# Patient Record
Sex: MALE | Race: BLACK OR AFRICAN AMERICAN | NOT HISPANIC OR LATINO | Employment: FULL TIME | URBAN - METROPOLITAN AREA
[De-identification: names, ages, dates, MRNs, and addresses within clinical notes are randomized per-mention and may not be internally consistent; named-entity substitution may affect disease eponyms.]

---

## 2025-01-07 ENCOUNTER — HOSPITAL ENCOUNTER (EMERGENCY)
Facility: HOSPITAL | Age: 45
Discharge: HOME | End: 2025-01-07
Payer: COMMERCIAL

## 2025-01-07 ENCOUNTER — APPOINTMENT (OUTPATIENT)
Dept: RADIOLOGY | Facility: HOSPITAL | Age: 45
End: 2025-01-07
Payer: COMMERCIAL

## 2025-01-07 VITALS
HEIGHT: 72 IN | HEART RATE: 94 BPM | DIASTOLIC BLOOD PRESSURE: 81 MMHG | WEIGHT: 185 LBS | RESPIRATION RATE: 16 BRPM | OXYGEN SATURATION: 100 % | BODY MASS INDEX: 25.06 KG/M2 | TEMPERATURE: 97.2 F | SYSTOLIC BLOOD PRESSURE: 134 MMHG

## 2025-01-07 DIAGNOSIS — J01.00 ACUTE NON-RECURRENT MAXILLARY SINUSITIS: Primary | ICD-10-CM

## 2025-01-07 LAB
FLUAV RNA RESP QL NAA+PROBE: NOT DETECTED
FLUBV RNA RESP QL NAA+PROBE: NOT DETECTED
SARS-COV-2 RNA RESP QL NAA+PROBE: NOT DETECTED

## 2025-01-07 PROCEDURE — 99284 EMERGENCY DEPT VISIT MOD MDM: CPT | Mod: 25

## 2025-01-07 PROCEDURE — 87636 SARSCOV2 & INF A&B AMP PRB: CPT

## 2025-01-07 PROCEDURE — 2500000001 HC RX 250 WO HCPCS SELF ADMINISTERED DRUGS (ALT 637 FOR MEDICARE OP)

## 2025-01-07 PROCEDURE — 71045 X-RAY EXAM CHEST 1 VIEW: CPT

## 2025-01-07 PROCEDURE — 2500000004 HC RX 250 GENERAL PHARMACY W/ HCPCS (ALT 636 FOR OP/ED)

## 2025-01-07 PROCEDURE — 71045 X-RAY EXAM CHEST 1 VIEW: CPT | Mod: FOREIGN READ | Performed by: RADIOLOGY

## 2025-01-07 RX ORDER — GUAIFENESIN 600 MG/1
1200 TABLET, EXTENDED RELEASE ORAL 2 TIMES DAILY
Qty: 120 TABLET | Refills: 0 | Status: SHIPPED | OUTPATIENT
Start: 2025-01-07 | End: 2025-02-06

## 2025-01-07 RX ORDER — GUAIFENESIN 600 MG/1
600 TABLET, EXTENDED RELEASE ORAL 2 TIMES DAILY PRN
Status: DISCONTINUED | OUTPATIENT
Start: 2025-01-07 | End: 2025-01-07

## 2025-01-07 RX ORDER — GUAIFENESIN 600 MG/1
600 TABLET, EXTENDED RELEASE ORAL ONCE
Status: COMPLETED | OUTPATIENT
Start: 2025-01-07 | End: 2025-01-07

## 2025-01-07 RX ORDER — AMOXICILLIN AND CLAVULANATE POTASSIUM 875; 125 MG/1; MG/1
1 TABLET, FILM COATED ORAL EVERY 12 HOURS
Qty: 14 TABLET | Refills: 0 | Status: SHIPPED | OUTPATIENT
Start: 2025-01-07 | End: 2025-01-14

## 2025-01-07 RX ORDER — IBUPROFEN 800 MG/1
800 TABLET ORAL ONCE
Status: COMPLETED | OUTPATIENT
Start: 2025-01-07 | End: 2025-01-07

## 2025-01-07 RX ORDER — IBUPROFEN 800 MG/1
800 TABLET ORAL 3 TIMES DAILY
Qty: 21 TABLET | Refills: 0 | Status: SHIPPED | OUTPATIENT
Start: 2025-01-07 | End: 2025-01-14

## 2025-01-07 RX ORDER — AMOXICILLIN AND CLAVULANATE POTASSIUM 875; 125 MG/1; MG/1
1 TABLET, FILM COATED ORAL ONCE
Status: COMPLETED | OUTPATIENT
Start: 2025-01-07 | End: 2025-01-07

## 2025-01-07 RX ADMIN — AMOXICILLIN AND CLAVULANATE POTASSIUM 1 TABLET: 875; 125 TABLET, FILM COATED ORAL at 10:50

## 2025-01-07 RX ADMIN — IBUPROFEN 800 MG: 800 TABLET, FILM COATED ORAL at 10:50

## 2025-01-07 RX ADMIN — GUAIFENESIN 600 MG: 600 TABLET ORAL at 10:50

## 2025-01-07 ASSESSMENT — PAIN DESCRIPTION - PAIN TYPE: TYPE: ACUTE PAIN

## 2025-01-07 ASSESSMENT — PAIN - FUNCTIONAL ASSESSMENT: PAIN_FUNCTIONAL_ASSESSMENT: 0-10

## 2025-01-07 ASSESSMENT — PAIN SCALES - GENERAL: PAINLEVEL_OUTOF10: 5 - MODERATE PAIN

## 2025-01-07 ASSESSMENT — PAIN DESCRIPTION - LOCATION: LOCATION: CHEST

## 2025-01-07 NOTE — ED PROVIDER NOTES
HPI   Chief Complaint   Patient presents with    URI     Pt presents to ed via self for complaints of URI sx x 1 week. Cough, congestion sore throat runny nose and pain when coughing.       HPI  Patient is a 44-year-old male presents ED for flulike symptoms x 1 week.  Patient reports cough, congestion, nasal drainage, postnasal drip, productive cough.  Denies any fever or chills.  Denies any shortness of breath or chest pain.  Denies any abdominal pain or NVD, urinary symptoms.  No recent hospitalizations or surgeries.  No recent travel or sick contacts.  Patient does smoke cigarettes.      Patient History   Past Medical History:   Diagnosis Date    Personal history of other diseases of the respiratory system 01/16/2020    History of asthma     No past surgical history on file.  No family history on file.  Social History     Tobacco Use    Smoking status: Not on file    Smokeless tobacco: Not on file   Substance Use Topics    Alcohol use: Not on file    Drug use: Not on file       Physical Exam   ED Triage Vitals [01/07/25 1032]   Temperature Heart Rate Respirations BP   36.2 °C (97.2 °F) 94 16 134/81      Pulse Ox Temp src Heart Rate Source Patient Position   100 % -- -- Sitting      BP Location FiO2 (%)     Left arm --       Physical Exam  Vitals reviewed.   Constitutional:       General: He is not in acute distress.     Appearance: Normal appearance. He is not ill-appearing.   HENT:      Head: Normocephalic and atraumatic.      Nose: Congestion and rhinorrhea present.      Mouth/Throat:      Pharynx: No oropharyngeal exudate or posterior oropharyngeal erythema.   Eyes:      Pupils: Pupils are equal, round, and reactive to light.   Cardiovascular:      Rate and Rhythm: Normal rate and regular rhythm.      Heart sounds: Normal heart sounds.   Pulmonary:      Effort: Pulmonary effort is normal.      Breath sounds: Normal breath sounds.   Abdominal:      General: Abdomen is flat.   Musculoskeletal:         General:  Normal range of motion.      Cervical back: Normal range of motion and neck supple.   Skin:     General: Skin is warm and dry.   Neurological:      Mental Status: He is alert and oriented to person, place, and time.   Psychiatric:         Mood and Affect: Mood normal.         Behavior: Behavior normal.           ED Course & MDM   Diagnoses as of 01/07/25 1127   Acute non-recurrent maxillary sinusitis                 No data recorded     [place] Coma Scale Score: 15 (01/07/25 1031 : Brian Paladino, RN)                           Medical Decision Making  Parts of this chart have been completed using voice recognition software. Please excuse any errors of transcription.  My thought process and reason for plan has been formulated from the time that I saw the patient until the time of disposition and is not specific to one specific moment during their visit and furthermore my MDM encompasses this entire chart and not only this text box.    HPI:   A medically appropriate HPI was obtained, outlined above.    Rory Langston is a  44 y.o. male    Chief Complaint   Patient presents with    URI     Pt presents to ed via self for complaints of URI sx x 1 week. Cough, congestion sore throat runny nose and pain when coughing.       Past Medical History:   Diagnosis Date    Personal history of other diseases of the respiratory system 01/16/2020    History of asthma       No past surgical history on file.         No family history on file.    No Known Allergies    Current Outpatient Medications   Medication Instructions    amoxicillin-pot clavulanate (Augmentin) 875-125 mg tablet 1 tablet, oral, Every 12 hours    guaiFENesin (MUCINEX) 1,200 mg, oral, 2 times daily, Do not crush, chew, or split.    ibuprofen 800 mg, oral, 3 times daily       History obtained from:   Patient    Exam:   Patient Vitals for the past 24 hrs:   BP Temp Pulse Resp SpO2 Height Weight   01/07/25 1032 134/81 36.2 °C (97.2 °F) 94 16 100 % 1.829 m (6') 83.9 kg  (185 lb)       A medically appropriate exam performed, outlined above, given the known history and presentation.    EKG/Cardiac monitor:     Social Determinants of Health considered during this visit:     Medications given during visit:  Medications   amoxicillin-pot clavulanate (Augmentin) 875-125 mg per tablet 1 tablet (1 tablet oral Given 1/7/25 1050)   ibuprofen tablet 800 mg (800 mg oral Given 1/7/25 1050)   guaiFENesin (Mucinex) 12 hr tablet 600 mg (600 mg oral Given 1/7/25 1050)        Diagnostic/tests:  Labs Reviewed   SARS-COV-2 AND INFLUENZA A/B PCR        XR chest 1 view   Final Result   No acute cardiopulmonary disease.   Signed by Ki Lu DO             [facility] Summary:  No evidence of pneumonia on chest x-ray.  Will treat for sinusitis with Augmentin.  Return precautions were discussed.    We have discussed the diagnosis and risks, and we agree with discharging home to follow-up with appropriate physician as directed. We also discussed returning to the Emergency Department immediately if new or worsening symptoms occur. We have discussed the symptoms which are most concerning that necessitate immediate return. Pt symptoms have been well controlled here and the patient is safe for discharge with appropriate outpatient follow up. The patient has verbalized understanding to return to ER without delay for new or worsening pains or for any other symptoms or concerns. I utilized the discharge clinical management tool provided [employer] to help estimate risk of negative outcome for this patient.      Disposition:  ED Prescriptions       Medication Sig Dispense Start Date End Date Auth. Provider    amoxicillin-pot clavulanate (Augmentin) 875-125 mg tablet Take 1 tablet by mouth every 12 hours for 7 days. 14 tablet 1/7/2025 1/14/2025 Henry Monzon PA-C    guaiFENesin (Mucinex) 600 mg 12 hr tablet Take 2 tablets (1,200 mg) by mouth 2 times a day. Do not crush, chew, or split. 120 tablet  1/7/2025 2/6/2025 Henry Monzon PA-C    ibuprofen 800 mg tablet Take 1 tablet (800 mg) by mouth 3 times a day for 7 days. 21 tablet 1/7/2025 1/14/2025 Henry Monzon PA-C              Procedure  Procedures     eHnry Monzon PA-C  01/07/25 1129

## 2025-02-08 ENCOUNTER — APPOINTMENT (OUTPATIENT)
Dept: RADIOLOGY | Facility: HOSPITAL | Age: 45
End: 2025-02-08
Payer: COMMERCIAL

## 2025-02-08 ENCOUNTER — HOSPITAL ENCOUNTER (EMERGENCY)
Facility: HOSPITAL | Age: 45
Discharge: HOME | End: 2025-02-08
Payer: COMMERCIAL

## 2025-02-08 VITALS
BODY MASS INDEX: 24.38 KG/M2 | OXYGEN SATURATION: 99 % | SYSTOLIC BLOOD PRESSURE: 122 MMHG | HEART RATE: 77 BPM | DIASTOLIC BLOOD PRESSURE: 78 MMHG | TEMPERATURE: 98.8 F | HEIGHT: 72 IN | RESPIRATION RATE: 16 BRPM | WEIGHT: 180 LBS

## 2025-02-08 DIAGNOSIS — J10.1 INFLUENZA A: Primary | ICD-10-CM

## 2025-02-08 LAB
FLUAV RNA RESP QL NAA+PROBE: DETECTED
FLUBV RNA RESP QL NAA+PROBE: NOT DETECTED
RSV RNA RESP QL NAA+PROBE: NOT DETECTED
SARS-COV-2 RNA RESP QL NAA+PROBE: NOT DETECTED

## 2025-02-08 PROCEDURE — 2500000001 HC RX 250 WO HCPCS SELF ADMINISTERED DRUGS (ALT 637 FOR MEDICARE OP): Performed by: PHYSICIAN ASSISTANT

## 2025-02-08 PROCEDURE — 71046 X-RAY EXAM CHEST 2 VIEWS: CPT | Performed by: RADIOLOGY

## 2025-02-08 PROCEDURE — 87637 SARSCOV2&INF A&B&RSV AMP PRB: CPT | Performed by: PHYSICIAN ASSISTANT

## 2025-02-08 PROCEDURE — 2500000004 HC RX 250 GENERAL PHARMACY W/ HCPCS (ALT 636 FOR OP/ED): Performed by: PHYSICIAN ASSISTANT

## 2025-02-08 PROCEDURE — 71046 X-RAY EXAM CHEST 2 VIEWS: CPT

## 2025-02-08 PROCEDURE — 96372 THER/PROPH/DIAG INJ SC/IM: CPT | Performed by: PHYSICIAN ASSISTANT

## 2025-02-08 PROCEDURE — 99284 EMERGENCY DEPT VISIT MOD MDM: CPT | Mod: 25

## 2025-02-08 RX ORDER — ACETAMINOPHEN 325 MG/1
650 TABLET ORAL EVERY 6 HOURS PRN
Qty: 30 TABLET | Refills: 0 | Status: SHIPPED | OUTPATIENT
Start: 2025-02-08 | End: 2025-02-18

## 2025-02-08 RX ORDER — IBUPROFEN 600 MG/1
600 TABLET ORAL EVERY 6 HOURS PRN
Qty: 20 TABLET | Refills: 0 | Status: SHIPPED | OUTPATIENT
Start: 2025-02-08

## 2025-02-08 RX ORDER — ALBUTEROL SULFATE 90 UG/1
2 INHALANT RESPIRATORY (INHALATION) EVERY 4 HOURS PRN
Qty: 18 G | Refills: 0 | Status: SHIPPED | OUTPATIENT
Start: 2025-02-08 | End: 2025-03-10

## 2025-02-08 RX ORDER — KETOROLAC TROMETHAMINE 30 MG/ML
30 INJECTION, SOLUTION INTRAMUSCULAR; INTRAVENOUS ONCE
Status: COMPLETED | OUTPATIENT
Start: 2025-02-08 | End: 2025-02-08

## 2025-02-08 RX ORDER — ACETAMINOPHEN 325 MG/1
975 TABLET ORAL ONCE
Status: COMPLETED | OUTPATIENT
Start: 2025-02-08 | End: 2025-02-08

## 2025-02-08 RX ORDER — OSELTAMIVIR PHOSPHATE 75 MG/1
75 CAPSULE ORAL EVERY 12 HOURS
Qty: 10 CAPSULE | Refills: 0 | Status: SHIPPED | OUTPATIENT
Start: 2025-02-08 | End: 2025-02-13

## 2025-02-08 RX ADMIN — KETOROLAC TROMETHAMINE 30 MG: 30 INJECTION, SOLUTION INTRAMUSCULAR at 15:32

## 2025-02-08 RX ADMIN — ACETAMINOPHEN 975 MG: 325 TABLET, FILM COATED ORAL at 15:32

## 2025-02-08 ASSESSMENT — PAIN - FUNCTIONAL ASSESSMENT: PAIN_FUNCTIONAL_ASSESSMENT: 0-10

## 2025-02-08 ASSESSMENT — COLUMBIA-SUICIDE SEVERITY RATING SCALE - C-SSRS
2. HAVE YOU ACTUALLY HAD ANY THOUGHTS OF KILLING YOURSELF?: NO
6. HAVE YOU EVER DONE ANYTHING, STARTED TO DO ANYTHING, OR PREPARED TO DO ANYTHING TO END YOUR LIFE?: NO
1. IN THE PAST MONTH, HAVE YOU WISHED YOU WERE DEAD OR WISHED YOU COULD GO TO SLEEP AND NOT WAKE UP?: NO

## 2025-02-08 ASSESSMENT — PAIN SCALES - GENERAL: PAINLEVEL_OUTOF10: 0 - NO PAIN

## 2025-02-08 NOTE — ED PROVIDER NOTES
HPI   Chief Complaint   Patient presents with    Flu Symptoms       44-year-old male presented emergency department the chief complaint of cough congestion, body aches, not feeling well.  His 3 other family members have similar symptoms are also in the emergency department.  He denies any chronic medical history.  He is well-appearing nontoxic afebrile not hypoxic or tachycardic.  Denies lightheadedness dizziness numbness weakness.  No other complaint.              Patient History   Past Medical History:   Diagnosis Date    Personal history of other diseases of the respiratory system 01/16/2020    History of asthma     No past surgical history on file.  No family history on file.  Social History     Tobacco Use    Smoking status: Not on file    Smokeless tobacco: Not on file   Substance Use Topics    Alcohol use: Not on file    Drug use: Not on file       Physical Exam   ED Triage Vitals [02/08/25 1522]   Temperature Heart Rate Respirations BP   37.1 °C (98.8 °F) 84 18 (!) 112/92      Pulse Ox Temp Source Heart Rate Source Patient Position   98 % Temporal -- --      BP Location FiO2 (%)     -- --       Physical Exam  Vitals and nursing note reviewed.   Constitutional:       Appearance: Normal appearance.   HENT:      Head: Normocephalic.      Nose: Nose normal.      Mouth/Throat:      Mouth: Mucous membranes are moist.   Cardiovascular:      Rate and Rhythm: Normal rate and regular rhythm.      Pulses: Normal pulses.   Pulmonary:      Effort: Pulmonary effort is normal.      Breath sounds: Normal breath sounds.   Abdominal:      General: Abdomen is flat.      Palpations: Abdomen is soft.   Musculoskeletal:         General: Normal range of motion.      Cervical back: Normal range of motion.   Skin:     General: Skin is warm.   Neurological:      General: No focal deficit present.      Mental Status: He is alert and oriented to person, place, and time.   Psychiatric:         Mood and Affect: Mood normal.          Behavior: Behavior normal.           ED Course & MDM   Diagnoses as of 02/08/25 1626   Influenza A                 No data recorded     [place] Coma Scale Score: 15 (02/08/25 1524 : Gale Chew RN)                           Medical Decision Making  I have seen and evaluated this patient.  The attending physician has also seen and evaluated this patient.  Vital signs, laboratory testing and diagnostic images if applicable have been reviewed.  All laboratory and imaging is interpreted by myself unless otherwise stated.  Radiology studies are also formally interpreted by radiologist.     Influenza A positive negative chest x-ray.  Patient treated with Tamiflu, antipyretic medication.  Released in stable condition from an emergent standpoint.          Labs Reviewed   SARS-COV-2 AND INFLUENZA A/B PCR - Abnormal       Result Value    Flu A Result Detected (*)     Flu B Result Not Detected      Coronavirus 2019, PCR Not Detected      Narrative:     This assay is an FDA-cleared, in vitro diagnostic nucleic acid amplification test for the qualitative detection and differentiation of SARS CoV-2/ Influenza A/B from nasopharyngeal specimens collected from individuals with signs and symptoms of respiratory tract infections, and has been validated for use at [facility]. Negative results do not preclude COVID-19/ Influenza A/B infections and should not be used as the sole basis for diagnosis, treatment, or other management decisions. Testing for SARS CoV-2 is recommended only for patients who meet current clinical and/or epidemiological criteria defined by federal, state, or local public health directives.   RSV PCR - Normal    RSV PCR Not Detected      Narrative:     This assay is an FDA-cleared, in vitro diagnostic nucleic acid amplification test for the detection of RSV from nasopharyngeal specimens, and has been validated for use at [facility]. Negative results do not preclude  RSV infections, and should not be used as the sole basis for diagnosis, treatment, or other management decisions. If Influenza A/B and RSV PCR results are negative, testing for Parainfluenza virus, Adenovirus and Metapneumovirus is routinely performed for pediatric oncology and intensive care inpatients at [facility], and is available on other patients by placing an add-on request.         XR chest 2 views    (Results Pending)     Medications   ketorolac (Toradol) injection 30 mg (30 mg intramuscular Given 2/8/25 1532)   acetaminophen (Tylenol) tablet 975 mg (975 mg oral Given 2/8/25 1532)     New Prescriptions    ACETAMINOPHEN (TYLENOL) 325 MG TABLET    Take 2 tablets (650 mg) by mouth every 6 hours if needed for mild pain (1 - 3) for up to 10 days.    ALBUTEROL 90 MCG/ACTUATION INHALER    Inhale 2 puffs every 4 hours if needed for wheezing.    IBUPROFEN 600 MG TABLET    Take 1 tablet (600 mg) by mouth every 6 hours if needed for fever (temp greater than 38.0 C) for up to 20 doses.    OSELTAMIVIR (TAMIFLU) 75 MG CAPSULE    Take 1 capsule (75 mg) by mouth every 12 hours for 5 days.         Procedure  Procedures     Minh Mcmahan PA-C  02/08/25 9540

## 2025-04-25 ENCOUNTER — TELEPHONE (OUTPATIENT)
Dept: PRIMARY CARE | Facility: CLINIC | Age: 45
End: 2025-04-25
Payer: COMMERCIAL

## 2025-05-12 ENCOUNTER — APPOINTMENT (OUTPATIENT)
Dept: PRIMARY CARE | Facility: CLINIC | Age: 45
End: 2025-05-12
Payer: COMMERCIAL

## 2025-05-12 VITALS
DIASTOLIC BLOOD PRESSURE: 80 MMHG | SYSTOLIC BLOOD PRESSURE: 128 MMHG | HEIGHT: 72 IN | WEIGHT: 171 LBS | HEART RATE: 94 BPM | BODY MASS INDEX: 23.16 KG/M2 | OXYGEN SATURATION: 97 %

## 2025-05-12 DIAGNOSIS — K57.30 COLON, DIVERTICULOSIS: ICD-10-CM

## 2025-05-12 DIAGNOSIS — R53.83 OTHER FATIGUE: ICD-10-CM

## 2025-05-12 DIAGNOSIS — Z00.00 ANNUAL PHYSICAL EXAM: Primary | ICD-10-CM

## 2025-05-12 DIAGNOSIS — N40.0 BENIGN PROSTATIC HYPERPLASIA WITHOUT LOWER URINARY TRACT SYMPTOMS: ICD-10-CM

## 2025-05-12 PROCEDURE — 4004F PT TOBACCO SCREEN RCVD TLK: CPT | Performed by: INTERNAL MEDICINE

## 2025-05-12 PROCEDURE — 3008F BODY MASS INDEX DOCD: CPT | Performed by: INTERNAL MEDICINE

## 2025-05-12 PROCEDURE — 99396 PREV VISIT EST AGE 40-64: CPT | Performed by: INTERNAL MEDICINE

## 2025-05-12 ASSESSMENT — PATIENT HEALTH QUESTIONNAIRE - PHQ9
SUM OF ALL RESPONSES TO PHQ9 QUESTIONS 1 AND 2: 0
2. FEELING DOWN, DEPRESSED OR HOPELESS: NOT AT ALL
1. LITTLE INTEREST OR PLEASURE IN DOING THINGS: NOT AT ALL

## 2025-05-12 ASSESSMENT — ENCOUNTER SYMPTOMS
HEMATOLOGIC/LYMPHATIC NEGATIVE: 1
OCCASIONAL FEELINGS OF UNSTEADINESS: 0
ENDOCRINE NEGATIVE: 1
NEUROLOGICAL NEGATIVE: 1
LOSS OF SENSATION IN FEET: 0
CONSTITUTIONAL NEGATIVE: 1
GASTROINTESTINAL NEGATIVE: 1
PSYCHIATRIC NEGATIVE: 1
MUSCULOSKELETAL NEGATIVE: 1
DEPRESSION: 0
RESPIRATORY NEGATIVE: 1
CARDIOVASCULAR NEGATIVE: 1
EYES NEGATIVE: 1
ALLERGIC/IMMUNOLOGIC NEGATIVE: 1

## 2025-05-12 NOTE — PROGRESS NOTES
Subjective   Patient ID: Rory Langston is a 45 y.o. male who presents for Annual Exam.    HPI     Review of Systems   Constitutional: Negative.    HENT: Negative.     Eyes: Negative.    Respiratory: Negative.     Cardiovascular: Negative.    Gastrointestinal: Negative.    Endocrine: Negative.    Musculoskeletal: Negative.    Skin: Negative.    Allergic/Immunologic: Negative.    Neurological: Negative.    Hematological: Negative.    Psychiatric/Behavioral: Negative.     All other systems reviewed and are negative.      Objective   /80   Pulse 94   Ht 1.829 m (6')   Wt 77.6 kg (171 lb)   SpO2 97%   BMI 23.19 kg/m²     Physical Exam  Vitals and nursing note reviewed.   Constitutional:       Appearance: Normal appearance.   HENT:      Head: Normocephalic and atraumatic.      Right Ear: Tympanic membrane, ear canal and external ear normal.      Left Ear: Tympanic membrane, ear canal and external ear normal. There is no impacted cerumen.      Nose: Nose normal.      Mouth/Throat:      Mouth: Mucous membranes are moist.      Pharynx: Oropharynx is clear.   Eyes:      Extraocular Movements: Extraocular movements intact.      Conjunctiva/sclera: Conjunctivae normal.      Pupils: Pupils are equal, round, and reactive to light.   Cardiovascular:      Rate and Rhythm: Normal rate and regular rhythm.      Pulses: Normal pulses.      Heart sounds: Normal heart sounds. No murmur heard.  Pulmonary:      Effort: Pulmonary effort is normal. No respiratory distress.      Breath sounds: Normal breath sounds. No stridor. No wheezing, rhonchi or rales.   Chest:      Chest wall: No tenderness.   Abdominal:      General: Abdomen is flat. Bowel sounds are normal. There is no distension.      Palpations: Abdomen is soft. There is no mass.      Tenderness: There is no abdominal tenderness. There is no right CVA tenderness, left CVA tenderness, guarding or rebound.      Hernia: No hernia is present.   Musculoskeletal:          General: Normal range of motion.      Cervical back: Normal range of motion and neck supple.   Skin:     General: Skin is warm.      Capillary Refill: Capillary refill takes less than 2 seconds.   Neurological:      General: No focal deficit present.      Mental Status: He is alert.      Cranial Nerves: No cranial nerve deficit.      Sensory: No sensory deficit.      Motor: No weakness.      Coordination: Coordination normal.      Gait: Gait normal.      Deep Tendon Reflexes: Reflexes normal.   Psychiatric:         Mood and Affect: Mood normal.         Behavior: Behavior normal. Behavior is cooperative.         Thought Content: Thought content normal.         Judgment: Judgment normal.         Assessment/Plan   Problem List Items Addressed This Visit           ICD-10-CM    Annual physical exam - Primary Z00.00    No recent hospitalizations.    All medications reviewed and reconciled by me the provider..  No use of controlled substances or opiates.    Family history, social history reviewed, no changes.    Patient does not smoke.    Patient does not drink.    Patient hydrates adequately daily.  Eats a well-balanced healthy diet.     Exercises adequately including walking and doing weightbearing exercises.    Patient denies any difficulty with memory or cognition.     Denies any difficulty with hearing.  Patient does not wear hearing aids.    No fall risk.  No recent falls.  Denies any difficulty walking.    Patient with no history of depression anxiety, denies any loss of interest, no feeling of sadness, no lack of motivation.    Patient is independent in all ADLs and IADLs.  Independent bathing, dressing, walking.  Takes care of own finances, shopping and cooking.     Preventive measures - Recommend ASAP :  Specialist. Last Colonoscopy 5 years ago in 2020 and it was normal only diverticulosis was found (educate patient risks of colon cancer) refer patient to GI specialist. Ophthalmology and retina exam recommend  yearly exams refer patient to an Ophthalmologist. BPH - (Benign Prostatic Hypertrophy) refer patient to an Urologist for rectal exam and PSA check.              Relevant Orders    Comprehensive Metabolic Panel    Lipid Panel    Colon, diverticulosis K57.30    Diverticulosis  - Patient has a history of Diverticulosis discovered with the 2020 colonoscopy Reviewed with the patient the colonoscopy results .  Educate high fiber diet and special diverticulitis diet consisting of : avoiding  nuts and seeds or raw vegetable and start Probiotic once a day  -                Benign prostatic hyperplasia without lower urinary tract symptoms N40.0    BPH - Benign PROSTATIC Hypertrophy, denies  Dysuria/Nocturia, check PSA, Educate exercises and Change in life style, prescribe vitamin E 400 IU qD. Consider referral to urology.          Relevant Orders    Prostate Specific Antigen    Other fatigue R53.83    Fatigue - check CMP(metabolic panel and elctrolytes) , CBC(complete blood cell count), TSH(thyroid function). Insomnia may play a role and sleep studies(rule out sleep apnea) are recommended . Educate sleep hygiene. Consider anxiety disorder vs. depression. Consider Stress test, and 2DECHO.           Relevant Orders    CBC and Auto Differential    Comprehensive Metabolic Panel    TSH with reflex to Free T4 if abnormal

## 2025-05-12 NOTE — ASSESSMENT & PLAN NOTE
Diverticulosis  - Patient has a history of Diverticulosis discovered with the 2020 colonoscopy Reviewed with the patient the colonoscopy results .  Educate high fiber diet and special diverticulitis diet consisting of : avoiding  nuts and seeds or raw vegetable and start Probiotic once a day  -

## 2025-05-12 NOTE — ASSESSMENT & PLAN NOTE
BPH - Benign PROSTATIC Hypertrophy, denies  Dysuria/Nocturia, check PSA, Educate exercises and Change in life style, prescribe vitamin E 400 IU qD. Consider referral to urology.

## 2025-05-12 NOTE — ASSESSMENT & PLAN NOTE
No recent hospitalizations.    All medications reviewed and reconciled by me the provider..  No use of controlled substances or opiates.    Family history, social history reviewed, no changes.    Patient does not smoke.    Patient does not drink.    Patient hydrates adequately daily.  Eats a well-balanced healthy diet.     Exercises adequately including walking and doing weightbearing exercises.    Patient denies any difficulty with memory or cognition.     Denies any difficulty with hearing.  Patient does not wear hearing aids.    No fall risk.  No recent falls.  Denies any difficulty walking.    Patient with no history of depression anxiety, denies any loss of interest, no feeling of sadness, no lack of motivation.    Patient is independent in all ADLs and IADLs.  Independent bathing, dressing, walking.  Takes care of own finances, shopping and cooking.     Preventive measures - Recommend ASAP :  Specialist. Last Colonoscopy 5 years ago in 2020 and it was normal only diverticulosis was found (educate patient risks of colon cancer) refer patient to GI specialist. Ophthalmology and retina exam recommend yearly exams refer patient to an Ophthalmologist. BPH - (Benign Prostatic Hypertrophy) refer patient to an Urologist for rectal exam and PSA check.

## 2025-05-17 LAB
ALBUMIN SERPL-MCNC: 4.6 G/DL (ref 3.6–5.1)
ALP SERPL-CCNC: 71 U/L (ref 36–130)
ALT SERPL-CCNC: 17 U/L (ref 9–46)
ANION GAP SERPL CALCULATED.4IONS-SCNC: 11 MMOL/L (CALC) (ref 7–17)
AST SERPL-CCNC: 21 U/L (ref 10–40)
BASOPHILS # BLD AUTO: 48 CELLS/UL (ref 0–200)
BASOPHILS NFR BLD AUTO: 0.9 %
BILIRUB SERPL-MCNC: 0.9 MG/DL (ref 0.2–1.2)
BUN SERPL-MCNC: 12 MG/DL (ref 7–25)
CALCIUM SERPL-MCNC: 9.2 MG/DL (ref 8.6–10.3)
CHLORIDE SERPL-SCNC: 104 MMOL/L (ref 98–110)
CHOLEST SERPL-MCNC: 168 MG/DL
CHOLEST/HDLC SERPL: 3.7 (CALC)
CO2 SERPL-SCNC: 24 MMOL/L (ref 20–32)
CREAT SERPL-MCNC: 1.14 MG/DL (ref 0.6–1.29)
EGFRCR SERPLBLD CKD-EPI 2021: 80 ML/MIN/1.73M2
EOSINOPHIL # BLD AUTO: 111 CELLS/UL (ref 15–500)
EOSINOPHIL NFR BLD AUTO: 2.1 %
ERYTHROCYTE [DISTWIDTH] IN BLOOD BY AUTOMATED COUNT: 13.8 % (ref 11–15)
GLUCOSE SERPL-MCNC: 134 MG/DL (ref 65–99)
HCT VFR BLD AUTO: 45.5 % (ref 38.5–50)
HDLC SERPL-MCNC: 46 MG/DL
HGB BLD-MCNC: 14.8 G/DL (ref 13.2–17.1)
LDLC SERPL CALC-MCNC: 83 MG/DL (CALC)
LYMPHOCYTES # BLD AUTO: 2512 CELLS/UL (ref 850–3900)
LYMPHOCYTES NFR BLD AUTO: 47.4 %
MCH RBC QN AUTO: 30.6 PG (ref 27–33)
MCHC RBC AUTO-ENTMCNC: 32.5 G/DL (ref 32–36)
MCV RBC AUTO: 94.2 FL (ref 80–100)
MONOCYTES # BLD AUTO: 398 CELLS/UL (ref 200–950)
MONOCYTES NFR BLD AUTO: 7.5 %
NEUTROPHILS # BLD AUTO: 2231 CELLS/UL (ref 1500–7800)
NEUTROPHILS NFR BLD AUTO: 42.1 %
NONHDLC SERPL-MCNC: 122 MG/DL (CALC)
PLATELET # BLD AUTO: 184 THOUSAND/UL (ref 140–400)
PMV BLD REES-ECKER: 10.8 FL (ref 7.5–12.5)
POTASSIUM SERPL-SCNC: 4.2 MMOL/L (ref 3.5–5.3)
PROT SERPL-MCNC: 7.7 G/DL (ref 6.1–8.1)
PSA SERPL-MCNC: 0.42 NG/ML
RBC # BLD AUTO: 4.83 MILLION/UL (ref 4.2–5.8)
SODIUM SERPL-SCNC: 139 MMOL/L (ref 135–146)
TRIGL SERPL-MCNC: 320 MG/DL
TSH SERPL-ACNC: 0.83 MIU/L (ref 0.4–4.5)
WBC # BLD AUTO: 5.3 THOUSAND/UL (ref 3.8–10.8)

## 2025-06-03 ENCOUNTER — TELEPHONE (OUTPATIENT)
Dept: PRIMARY CARE | Facility: CLINIC | Age: 45
End: 2025-06-03
Payer: COMMERCIAL

## 2025-06-04 DIAGNOSIS — R73.01 ELEVATED FASTING GLUCOSE: ICD-10-CM

## 2025-06-04 NOTE — TELEPHONE ENCOUNTER
I called patient cell is disconnected, I called patient wife, and gave her message to have pt to call office back re labs

## 2025-06-04 NOTE — TELEPHONE ENCOUNTER
Spoke with patient. He is aware of the lab order and will get it done. Explained why we need the test, pt aware

## 2025-06-06 ENCOUNTER — TELEPHONE (OUTPATIENT)
Dept: PRIMARY CARE | Facility: CLINIC | Age: 45
End: 2025-06-06

## 2025-06-06 ENCOUNTER — OFFICE VISIT (OUTPATIENT)
Dept: PRIMARY CARE | Facility: CLINIC | Age: 45
End: 2025-06-06
Payer: COMMERCIAL

## 2025-06-06 VITALS
WEIGHT: 171.96 LBS | HEART RATE: 90 BPM | BODY MASS INDEX: 23.32 KG/M2 | SYSTOLIC BLOOD PRESSURE: 141 MMHG | DIASTOLIC BLOOD PRESSURE: 90 MMHG | OXYGEN SATURATION: 97 %

## 2025-06-06 DIAGNOSIS — E11.9 TYPE 2 DIABETES MELLITUS WITHOUT COMPLICATION, WITHOUT LONG-TERM CURRENT USE OF INSULIN: Primary | ICD-10-CM

## 2025-06-06 LAB
EST. AVERAGE GLUCOSE BLD GHB EST-MCNC: 186 MG/DL
EST. AVERAGE GLUCOSE BLD GHB EST-SCNC: 10.3 MMOL/L
HBA1C MFR BLD: 8.1 %

## 2025-06-06 PROCEDURE — 3080F DIAST BP >= 90 MM HG: CPT | Performed by: INTERNAL MEDICINE

## 2025-06-06 PROCEDURE — 3077F SYST BP >= 140 MM HG: CPT | Performed by: INTERNAL MEDICINE

## 2025-06-06 PROCEDURE — 99213 OFFICE O/P EST LOW 20 MIN: CPT | Performed by: INTERNAL MEDICINE

## 2025-06-06 ASSESSMENT — ENCOUNTER SYMPTOMS
LOSS OF SENSATION IN FEET: 0
DEPRESSION: 0
POLYDIPSIA: 0
NUMBNESS: 0
DIARRHEA: 0
OCCASIONAL FEELINGS OF UNSTEADINESS: 0
POLYPHAGIA: 0

## 2025-06-06 ASSESSMENT — PATIENT HEALTH QUESTIONNAIRE - PHQ9
2. FEELING DOWN, DEPRESSED OR HOPELESS: NOT AT ALL
SUM OF ALL RESPONSES TO PHQ9 QUESTIONS 1 AND 2: 0
1. LITTLE INTEREST OR PLEASURE IN DOING THINGS: NOT AT ALL

## 2025-06-06 NOTE — PATIENT INSTRUCTIONS
Please stop sugar in coffee, replace white starches for brown rice, wheat pasta, sweet potatoes, eat good amount of vegetable, salad and proteins. No sweets . See nutritionist and return here in 1 month.

## 2025-06-06 NOTE — TELEPHONE ENCOUNTER
----- Message from Adriana Martínez sent at 6/4/2025  2:55 PM EDT -----  You enter labs for this pt but no office visit? He needs to see someone to explain about diabetes meds, education, monitor , etc

## 2025-06-06 NOTE — ASSESSMENT & PLAN NOTE
Extensive diabetic education, diet modification elimiation of sugar and reduction of alcohol, discussed monitor and medications that pt is not ready to start.

## 2025-06-06 NOTE — PROGRESS NOTES
Per Dr Brown, he would like patient to follow up in the office next week. I called the patient this morning and left her a voicemail along with the office number for the patient to call and schedule   Subjective   Patient ID: Rory Langston is a 45 y.o. male who presents for follow up labs.    HPI He has hmga1c of 8.1 yeasterday and elevated fasting glucose . His diet consist of coffee cream sugar in am, mid day bagel ocassionally, most of the time large dinner vegetable, starch, frequent salad, protein from beef, poulty, fish, soda every night. Sweets seldom. Drinks 2 servings of ETOH evernight. He believes he lost over 10 lb in last year.   Walks at work over 6 ,000 steps/day workdays  We review with pt labs from May: psa, cbc, renal, hepatic, tsh and lipid wnl.    Review of Systems   Constitutional:         Some weight lost   Gastrointestinal:  Negative for diarrhea.   Endocrine: Negative for polydipsia, polyphagia and polyuria.   Neurological:  Negative for numbness.   All other systems reviewed and are negative.      Objective   BP (!) 159/96   Pulse 90   Wt 78 kg (171 lb 15.3 oz)   SpO2 97%   BMI 23.32 kg/m²     Physical Exam  Eyes- Conjunctiva clear  Neck-no thyromegaly  Cardiac- regular rate and rhythm, no murmurs  Lung- clear to auscultation  GI- present  bowel sounds, nontender, no rebound  MSK- no deformities  Extremities- no edema, good distal pulses  Neuro- non focal, oriented x 3  Psychiatric- pleasant, well groomed, no hallucinations    Assessment/Plan   Problem List Items Addressed This Visit           ICD-10-CM    Type 2 diabetes mellitus without complication, without long-term current use of insulin - Primary E11.9    Extensive diabetic education, diet modification elimiation of sugar and reduction of alcohol, discussed monitor and medications that pt is not ready to start.         Relevant Orders    Referral to Nutrition Services

## 2025-07-07 NOTE — PROGRESS NOTES
Nutrition Initial Assessment:     Patient Rory Langston is a 45 y.o. male being seen via virtual visit  who was referred by Dr. Martínez on 6/6/25 for   1. Type 2 diabetes mellitus without complication, without long-term current use of insulin  Referral to Nutrition Services          Virtual or Telephone Consent    An interactive audio and video telecommunication system which permits real time communications between the patient (at the originating site) and provider (at the distant site) was utilized to provide this telehealth service.   Verbal consent was requested and obtained from Rory Langston on this date, 07/09/25 for a telehealth visit and the patient's location was confirmed at the time of the visit.    Nutrition Assessment    Problem List[1]    Nutrition History:  Food & Nutrition History:    Pt was recently diagnosed with DM.  Works 5:45 am - 2:30 pm, M-F. Sometimes brings food.  Food Allergies: None;  Food Intolerances: none  Vitamin/mineral intake: None  Herbal supplements: Fish Oil  Medication and Complementary/Alternative Medicine Use:   GI Symptoms: None  Mouth Issues: denies; Teeth Issues: no issues  Sleep Habits: 5-6 hours continuous    Diet Recall: 1 meal per day, skips breakfast and lunch  Meal 1:   9 am - 2 HB eggs  Snack:   bag of chips  Meal 2:   5-6 pm  - steak, rice, vegetable  Snack:  Meal 3:   Snack:  Food Variety: Present  Oral Nutrition Supplement Use: None   Fluid Intake:   48-64 oz, gingerale  - stop, alcohol - tequila  -once a day, mixed with gingerale  Energy Intake: Fair (50-75% EEN)    Diabetes Nutrition History:  Diagnosed:  Last month  Prior Nutrition Education:  none  SMBG:   Hypoglycemia:  none  Current DM Medications/Insulin Regimen:  none    Food Preparation:  Cooking: Partner/Spouse  Grocery Shopping: Partner/Spouse  Dining Out: 1 to 3 times a month    Physical Activity:   Walk during work and active at work,    Food Insecurity: Absent    Anthropometrics:                    "         Weight History:   Daily Weight  06/06/25 : 78 kg (171 lb 15.3 oz)  05/12/25 : 77.6 kg (171 lb)  02/08/25 : 81.6 kg (180 lb)  01/07/25 : 83.9 kg (185 lb)  04/18/22 : 83.9 kg (185 lb)  03/28/22 : 81.6 kg (180 lb)  08/05/20 : 83 kg (183 lb)  05/18/20 : 83.5 kg (184 lb)  02/18/20 : 81.6 kg (180 lb)  01/16/20 : 84.4 kg (186 lb)    Weight Change %: 10#       Nutrition Focused Physical Exam Findings:  Subcutaneous Fat Loss:   Defer Subcutaneous Fat Loss Assessment: Defer all  Defer All Reason: Virtual or phone only visit    Muscle Wasting:  Defer Muscle Wasting Assessment: Defer all  Defer All Reason: Virtual or phone only visit    Physical Findings:  Hair:    Eyes:    Nails:    Skin:    Respiratory:    Edema:        Nutrition Significant Labs:  CMP Trend:    Recent Labs     05/16/25 0845 03/23/22 2040 01/18/22 0202 04/23/21  1456   GLUCOSE 134* 181* 146* 142*    138 137 135*   K 4.2 3.5 3.7 4.8    101 101 104   CO2 24 23 29 25   ANIONGAP 11 18 11 11   BUN 12 12 11 8   CREATININE 1.14 1.37* 1.47* 1.15   EGFR 80  --   --   --    CALCIUM 9.2 9.4 9.4 8.9   ALBUMIN 4.6  --  4.6 4.3   ALKPHOS 71  --  76 74   PROT 7.7  --  7.1 7.2   AST 21  --  18 20   BILITOT 0.9  --  0.6 0.8   ALT 17  --  17 16   , CBC Trend:   Recent Labs     05/16/25  0845 03/23/22 2040 01/18/22  0202   WBC 5.3 6.4 9.9   RBC 4.83 4.67 4.54   HGB 14.8 14.4 14.6   HCT 45.5 41.7 40.9*   MCV 94.2 89 90   MCH 30.6  --   --    MCHC 32.5 34.5 35.7   RDW 13.8 13.0 13.2    169 221   , DM Specific Labs Trend (Includes HgbA1C, antibodies & fasting insulin):   Recent Labs     06/05/25  0937   HGBA1C 8.1*   , Lipid Panel Trend:    Recent Labs     05/16/25  0845   CHOL 168   HDL 46   LDLCALC 83   TRIG 320*   , and Vitamin D: No results found for: \"VITD25\"     Medications:  Current Outpatient Medications   Medication Instructions    albuterol 90 mcg/actuation inhaler 2 puffs, inhalation, Every 4 hours PRN    ibuprofen 600 mg, oral, Every 6 " hours PRN        Estimated Needs:  Did not discuss   ;     ;     ;     ;                    Nutrition Diagnosis   Malnutrition Diagnosis  Patient has Malnutrition Diagnosis: No    Nutrition Diagnosis  Patient has Nutrition Diagnosis: Yes  Diagnosis Status (1): New  Nutrition Diagnosis 1: Food and nutrition related knowledge deficit  Related to (1): Lack of or limited prior nutrition-related education  As Evidenced by (1): diet recall, need for a diabetic diet  Additional Nutrition Diagnosis: Diagnosis 2  Diagnosis Status (2): New  Nutrition Diagnosis 2: Altered nutrition related to laboratory values  Related to (2): organ dysfunction that leads to biochemical changes  As Evidenced by (2): elevated A1c of 8.1% and TG  -320 mg/ld       Nutrition Interventions/Recommendations   Nutrition Prescription:  Individualized Nutrition Prescription Provided for : Oral nutrition General Healthy diet with focus on CHO control for DM management    Nutrition Interventions:   Food and Nutrient Delivery:   Goals: 1) 3 meals per day that include 45 gram of CHO plus protein  2) compliance with diabetic diet     Coordination of Care:       Nutrition Education: Content related nutrition education  Balanced meals using plate method, timing of meals, staying hydrated with water or other low calorie beverages, benefits from exercise, reviewed basics of what is a CHO and portion sizes of CHO food for meal planning, reviewed normal glucose metabolism and what happens to glucose metabolism with DM    Nutrition Education Topics Discussed:   See wrap up section    Educational Handouts Provided: ADA Plate Method, [facility] Carbohydrate Counting, and [facility] CHO Counting for People With Diabetes      Nutrition Counseling:   Nutrition Counseling Strategies : Nutrition counseling based on motivational interviewing strategy, Nutrition counseling based on goal setting strategy  Patient Goals:       Readiness to Change: Good   Level of Understanding:  Good  Anticipated Compliance: Good       Nutrition Monitoring and Evaluation   Meal/Snack Pattern: Estimated meal and snack pattern  Criteria: Monitor patient's progress towards meal and snack goal(s)              Glucose/Endocrine Profile: Hemoglobin A1c (HgbA1c)  Criteria: <7%  Lipid Profile: Triglycerides, serum  Criteria: CHOL <200;  HDL 40-60;  LDL <70;  TG <150 mg/dL                 Follow Up: Patient declined nutrition follow up appointment. This service will remain available if patient wishes to schedule a follow up. Referral is good for 1 year (expires on 6/6/26). If patient wishes to schedule a follow up, can call Nutrition Scheduling Line at [phone].               [1]   Patient Active Problem List  Diagnosis    Annual physical exam    Colon, diverticulosis    Benign prostatic hyperplasia without lower urinary tract symptoms    Other fatigue    Type 2 diabetes mellitus without complication, without long-term current use of insulin

## 2025-07-09 ENCOUNTER — TELEMEDICINE CLINICAL SUPPORT (OUTPATIENT)
Dept: NUTRITION | Facility: CLINIC | Age: 45
End: 2025-07-09
Payer: COMMERCIAL

## 2025-07-09 DIAGNOSIS — E11.9 TYPE 2 DIABETES MELLITUS WITHOUT COMPLICATION, WITHOUT LONG-TERM CURRENT USE OF INSULIN: Primary | ICD-10-CM

## 2025-07-09 PROCEDURE — 97802 MEDICAL NUTRITION INDIV IN: CPT | Mod: 95 | Performed by: DIETITIAN, REGISTERED

## 2025-07-09 NOTE — PATIENT INSTRUCTIONS
1) Reviewed diabetic diet including carbohydrate-containing food, appropriate carbs per meal as well as appropriate portion sizes. We discussed that 1 serving of a carbohydrate is equal to 15 gram and patient should consume no more than 3-4 servings per meal for weight maintenance or 2-3 per meal for weight loss. Education materials were provided and meal and snack options discussed. Aim for A1c <7%     2) We reviewed the food plate method to help improve healthy eating habits. We discussed that half of the plate should contain 2 non-starchy vegetables (all vegetable besides peas, corn and potatoes), 1/4 of the plate should contain lean protein and 1/4 of the plate should contain a healthy starch.    3)     We reviewed the importance of having consistent meals and snacks throughout the day to improve or maintain good glycemic control and to increase metabolism to aid with weight loss. Pt should aim to consume a meal or snack every 3-4 hours which contains a lean protein (lean chicken, turkey, fish, eggs, low fat yogurt, nuts, peanut butter, bean) and healthy starch (fruits, whole grains)

## 2025-07-11 ENCOUNTER — APPOINTMENT (OUTPATIENT)
Dept: PRIMARY CARE | Facility: CLINIC | Age: 45
End: 2025-07-11
Payer: COMMERCIAL

## 2025-07-11 VITALS
OXYGEN SATURATION: 98 % | HEART RATE: 97 BPM | DIASTOLIC BLOOD PRESSURE: 80 MMHG | SYSTOLIC BLOOD PRESSURE: 128 MMHG | BODY MASS INDEX: 23.19 KG/M2 | RESPIRATION RATE: 18 BRPM | WEIGHT: 171 LBS

## 2025-07-11 DIAGNOSIS — E11.9 TYPE 2 DIABETES MELLITUS WITHOUT COMPLICATION, WITHOUT LONG-TERM CURRENT USE OF INSULIN: ICD-10-CM

## 2025-07-11 DIAGNOSIS — Z71.6 ENCOUNTER FOR SMOKING CESSATION COUNSELING: ICD-10-CM

## 2025-07-11 DIAGNOSIS — Z01.00 DIABETIC EYE EXAM (MULTI): Primary | ICD-10-CM

## 2025-07-11 DIAGNOSIS — E11.9 DIABETIC EYE EXAM (MULTI): Primary | ICD-10-CM

## 2025-07-11 LAB — POC FINGERSTICK BLOOD GLUCOSE: 137 MG/DL (ref 70–100)

## 2025-07-11 PROCEDURE — 3074F SYST BP LT 130 MM HG: CPT | Performed by: INTERNAL MEDICINE

## 2025-07-11 PROCEDURE — 4004F PT TOBACCO SCREEN RCVD TLK: CPT | Performed by: INTERNAL MEDICINE

## 2025-07-11 PROCEDURE — 82962 GLUCOSE BLOOD TEST: CPT | Performed by: INTERNAL MEDICINE

## 2025-07-11 PROCEDURE — 99213 OFFICE O/P EST LOW 20 MIN: CPT | Performed by: INTERNAL MEDICINE

## 2025-07-11 PROCEDURE — 3079F DIAST BP 80-89 MM HG: CPT | Performed by: INTERNAL MEDICINE

## 2025-07-11 RX ORDER — IBUPROFEN 200 MG
CAPSULE ORAL
Qty: 200 STRIP | Refills: 3 | Status: SHIPPED | OUTPATIENT
Start: 2025-07-11

## 2025-07-11 RX ORDER — LANCETS
EACH MISCELLANEOUS
Qty: 200 EACH | Refills: 3 | Status: SHIPPED | OUTPATIENT
Start: 2025-07-11

## 2025-07-11 RX ORDER — DEXTROSE 4 G
TABLET,CHEWABLE ORAL
Qty: 1 EACH | Refills: 0 | Status: SHIPPED | OUTPATIENT
Start: 2025-07-11

## 2025-07-11 RX ORDER — NICOTINE 21-14-7MG
KIT TRANSDERMAL
Qty: 56 EACH | Refills: 1 | Status: SHIPPED | OUTPATIENT
Start: 2025-07-11

## 2025-07-11 ASSESSMENT — PATIENT HEALTH QUESTIONNAIRE - PHQ9
SUM OF ALL RESPONSES TO PHQ9 QUESTIONS 1 AND 2: 0
1. LITTLE INTEREST OR PLEASURE IN DOING THINGS: NOT AT ALL
2. FEELING DOWN, DEPRESSED OR HOPELESS: NOT AT ALL

## 2025-07-11 ASSESSMENT — ENCOUNTER SYMPTOMS
DEPRESSION: 0
LOSS OF SENSATION IN FEET: 0
OCCASIONAL FEELINGS OF UNSTEADINESS: 0

## 2025-07-11 NOTE — ASSESSMENT & PLAN NOTE
Discussed diet in detail, monitor either fasting or 2 h postpandrial, also medications. He wants to wait 2 more months, follow diet , and complete new hmga1c plus monitoring at home .  Orders:    POCT Fingerstick Glucose manually resulted

## 2025-07-11 NOTE — PROGRESS NOTES
Subjective   Patient ID: Rory Langston is a 45 y.o. male who presents for follow up diabetes.    HPI Since last visit he cut down in ginger ale. He saw nutritionist last week who advised to eat 3 meals, and avoid late meal at night. He has reduced liquour to one serving at night. No frequent seldom  This morning about 7:30  am he had 2 boil eggs and bag of chips. Now 5 hours.  He has smoked since early 19, less than pack a day. He is interested in trying to stop. We dicussed nicotine patch/    Review of Systems   Eyes:         Feeling of foreign body on bilateral eyes for long time, denies pain, photophobia, blurried vision, or discharge   Gastrointestinal:         Heartburn trigered by food, seldom.   All other systems reviewed and are negative.      Objective   /80   Pulse 97   Resp 18   Wt 77.6 kg (171 lb)   SpO2 98%   BMI 23.19 kg/m²     Physical Exam  Eyes- Conjunctiva clear  Neck-no thyromegaly  Cardiac- regular rate and rhythm, no murmurs  Lung- clear to auscultation  GI- present  bowel sounds, nontender, no rebound  MSK- no deformities  Extremities- no edema, good distal pulses  Neuro- non focal, oriented x 3  Psychiatric- pleasant, well groomed, no hallucinations    Assessment/Plan   Assessment & Plan  Type 2 diabetes mellitus without complication, without long-term current use of insulin  Discussed diet in detail, monitor either fasting or 2 h postpandrial, also medications. He wants to wait 2 more months, follow diet , and complete new hmga1c plus monitoring at home .  Orders:    POCT Fingerstick Glucose manually resulted    Diabetic eye exam (Multi)  Plus evaluation of current symptoms  Orders:    Referral to Ophthalmology; Future    Encounter for smoking cessation counseling  Discussed alternatives, pt wants to try again nicotine patch

## 2025-10-16 ENCOUNTER — APPOINTMENT (OUTPATIENT)
Dept: OPHTHALMOLOGY | Facility: CLINIC | Age: 45
End: 2025-10-16
Payer: COMMERCIAL